# Patient Record
Sex: FEMALE | Race: OTHER | Employment: UNEMPLOYED
[De-identification: names, ages, dates, MRNs, and addresses within clinical notes are randomized per-mention and may not be internally consistent; named-entity substitution may affect disease eponyms.]

---

## 2022-11-21 ENCOUNTER — OFFICE VISIT (OUTPATIENT)
Dept: INTERNAL MEDICINE CLINIC | Facility: CLINIC | Age: 82
End: 2022-11-21
Payer: COMMERCIAL

## 2022-11-21 VITALS
BODY MASS INDEX: 24.2 KG/M2 | OXYGEN SATURATION: 98 % | DIASTOLIC BLOOD PRESSURE: 70 MMHG | HEIGHT: 61 IN | SYSTOLIC BLOOD PRESSURE: 130 MMHG | WEIGHT: 128.2 LBS | HEART RATE: 73 BPM

## 2022-11-21 DIAGNOSIS — C44.90 NON-MELANOMA SKIN CANCER: ICD-10-CM

## 2022-11-21 DIAGNOSIS — Z23 ENCOUNTER FOR IMMUNIZATION: ICD-10-CM

## 2022-11-21 DIAGNOSIS — Z87.42 HISTORY OF CERVICAL POLYPECTOMY: ICD-10-CM

## 2022-11-21 DIAGNOSIS — Z98.890 HISTORY OF CERVICAL POLYPECTOMY: ICD-10-CM

## 2022-11-21 DIAGNOSIS — Z12.11 COLON CANCER SCREENING: ICD-10-CM

## 2022-11-21 DIAGNOSIS — Z12.4 CERVICAL CANCER SCREENING: ICD-10-CM

## 2022-11-21 DIAGNOSIS — E03.9 HYPOTHYROIDISM (ACQUIRED): ICD-10-CM

## 2022-11-21 DIAGNOSIS — F41.9 ANXIETY DISORDER, UNSPECIFIED TYPE: ICD-10-CM

## 2022-11-21 DIAGNOSIS — I10 HYPERTENSION, ESSENTIAL: Primary | ICD-10-CM

## 2022-11-21 DIAGNOSIS — Z12.31 ENCOUNTER FOR SCREENING MAMMOGRAM FOR MALIGNANT NEOPLASM OF BREAST: ICD-10-CM

## 2022-11-21 PROBLEM — Z12.39 BREAST CANCER SCREENING: Status: ACTIVE | Noted: 2022-11-21

## 2022-11-21 PROCEDURE — 1123F ACP DISCUSS/DSCN MKR DOCD: CPT | Performed by: INTERNAL MEDICINE

## 2022-11-21 PROCEDURE — 3075F SYST BP GE 130 - 139MM HG: CPT | Performed by: INTERNAL MEDICINE

## 2022-11-21 PROCEDURE — 99204 OFFICE O/P NEW MOD 45 MIN: CPT | Performed by: INTERNAL MEDICINE

## 2022-11-21 PROCEDURE — 3078F DIAST BP <80 MM HG: CPT | Performed by: INTERNAL MEDICINE

## 2022-11-21 RX ORDER — IRBESARTAN 150 MG/1
150 TABLET ORAL NIGHTLY
COMMUNITY
End: 2022-11-21 | Stop reason: SDUPTHER

## 2022-11-21 RX ORDER — HYDROCHLOROTHIAZIDE 12.5 MG/1
12.5 TABLET ORAL DAILY
COMMUNITY
End: 2022-11-21 | Stop reason: SDUPTHER

## 2022-11-21 RX ORDER — LEVOTHYROXINE SODIUM 0.07 MG/1
75 TABLET ORAL DAILY
COMMUNITY
End: 2022-11-21 | Stop reason: SDUPTHER

## 2022-11-21 RX ORDER — HYDROCHLOROTHIAZIDE 12.5 MG/1
12.5 TABLET ORAL DAILY
Qty: 90 TABLET | Refills: 2 | Status: SHIPPED | OUTPATIENT
Start: 2022-11-21

## 2022-11-21 RX ORDER — ALPRAZOLAM 1 MG/1
1 TABLET ORAL AS NEEDED
COMMUNITY

## 2022-11-21 RX ORDER — LEVOTHYROXINE SODIUM 0.07 MG/1
75 TABLET ORAL DAILY
Qty: 30 TABLET | Status: CANCELLED | OUTPATIENT
Start: 2022-11-21

## 2022-11-21 RX ORDER — LEVOTHYROXINE SODIUM 0.07 MG/1
75 TABLET ORAL DAILY
Qty: 90 TABLET | Refills: 2 | Status: SHIPPED | OUTPATIENT
Start: 2022-11-21

## 2022-11-21 RX ORDER — IRBESARTAN 150 MG/1
150 TABLET ORAL NIGHTLY
Qty: 90 TABLET | Refills: 2 | Status: SHIPPED | OUTPATIENT
Start: 2022-11-21

## 2022-11-21 ASSESSMENT — PATIENT HEALTH QUESTIONNAIRE - PHQ9
SUM OF ALL RESPONSES TO PHQ QUESTIONS 1-9: 0
SUM OF ALL RESPONSES TO PHQ QUESTIONS 1-9: 0
1. LITTLE INTEREST OR PLEASURE IN DOING THINGS: 0
SUM OF ALL RESPONSES TO PHQ9 QUESTIONS 1 & 2: 0
SUM OF ALL RESPONSES TO PHQ QUESTIONS 1-9: 0
SUM OF ALL RESPONSES TO PHQ QUESTIONS 1-9: 0
2. FEELING DOWN, DEPRESSED OR HOPELESS: 0

## 2022-11-21 ASSESSMENT — ENCOUNTER SYMPTOMS
STRIDOR: 0
EYE PAIN: 0
VOICE CHANGE: 0
RECTAL PAIN: 0

## 2022-11-21 NOTE — PROGRESS NOTES
NEW PATIENT VISIT    Subjective:    Ms. Jennifer Arias is a 81 y.o., female,   Chief Complaint   Patient presents with    New Patient       HPI:    Ms. Jennifer Arias is a 81 y.o., female who presents today for a new patient appointment. Their previous primary provider was a provider in [place]. Recently moved to the [address] [place] [address],[address]. Old records were reviewed. The patient has hypertension. The patient has been on an attempted low sodium diet and has been trying to exercise and maintain a healthy weight. The patient reports good compliance with the blood pressure medications. The patient has hypothyroidism. The patient denies any symptoms of hypo- or hyperthyroidism on the current dose of levothyroxine. She has anxiety attacks. Aborts with alprazolam PRN. Uses 1-2 x per week. Does not drive, use alcohol, or other sedatives. Finally has a history of basal cell skin cancer. Has lesion on central chest.  Requests referral to dermatology. The following portions of the patient's history were reviewed and updated as appropriate:      Past Medical History:   Diagnosis Date    Anxiety disorder     Hypertension, essential     Hypothyroidism (acquired)     Non-melanoma skin cancer        Past Surgical History:   Procedure Laterality Date    BREAST REDUCTION SURGERY      CATARACT EXTRACTION      CHOLECYSTECTOMY, LAPAROSCOPIC      DENTAL SURGERY      multiple extraction / dentures       Family History   Problem Relation Age of Onset    No Known Problems Mother     No Known Problems Father        Social History     Socioeconomic History    Marital status:       Spouse name: Not on file    Number of children: Not on file    Years of education: Not on file    Highest education level: Not on file   Occupational History    Not on file   Tobacco Use    Smoking status: Never    Smokeless tobacco: Never   Substance and Sexual Activity    Alcohol use: Never    Drug use: Never    Sexual activity: Not on file   Other Topics Concern    Not on file   Social History Narrative    Not on file     Social Determinants of Health     Financial Resource Strain: Not on file   Food Insecurity: Not on file   Transportation Needs: Not on file   Physical Activity: Not on file   Stress: Not on file   Social Connections: Not on file   Intimate Partner Violence: Not on file   Housing Stability: Not on file       Current Outpatient Medications   Medication Sig Dispense Refill    ALPRAZolam (XANAX) 1 MG tablet Take 1 mg by mouth as needed for Sleep.      hydroCHLOROthiazide (HYDRODIURIL) 12.5 MG tablet Take 1 tablet by mouth daily 90 tablet 2    irbesartan (AVAPRO) 150 MG tablet Take 1 tablet by mouth nightly 90 tablet 2    levothyroxine (SYNTHROID) 75 MCG tablet Take 1 tablet by mouth Daily 90 tablet 2     No current facility-administered medications for this visit. Allergies as of 11/21/2022    (Not on File)       Review of Systems   Constitutional:  Negative for activity change and appetite change. HENT:  Negative for drooling and voice change. Eyes:  Negative for pain. Respiratory:  Negative for stridor. Gastrointestinal:  Negative for rectal pain. Endocrine: Negative for polydipsia and polyphagia. Genitourinary:  Negative for dyspareunia and enuresis. Musculoskeletal:  Negative for gait problem and neck stiffness. Skin:  Negative for pallor. Neurological:  Negative for facial asymmetry and speech difficulty. Hematological:  Does not bruise/bleed easily. Psychiatric/Behavioral:  Negative for self-injury. The patient is not hyperactive. Patient Care Team:  Nette Fountain MD as PCP - General (Internal Medicine)    Objective:    /70   Pulse 73   Ht 5' 1\" (1.549 m)   Wt 128 lb 3.2 oz (58.2 kg)   SpO2 98%   BMI 24.22 kg/m²     Physical Exam  Vitals reviewed. Constitutional:       General: She is not in acute distress. Appearance: She is not toxic-appearing.    HENT:      Head: Normocephalic and atraumatic. Right Ear: Tympanic membrane, ear canal and external ear normal.      Left Ear: Tympanic membrane, ear canal and external ear normal.      Nose: Nose normal.      Mouth/Throat:      Mouth: Mucous membranes are moist.      Pharynx: Oropharynx is clear. Eyes:      General: No scleral icterus. Extraocular Movements: Extraocular movements intact. Pupils: Pupils are equal, round, and reactive to light. Cardiovascular:      Rate and Rhythm: Normal rate and regular rhythm. Pulses: Normal pulses. Heart sounds: Normal heart sounds. Pulmonary:      Effort: Pulmonary effort is normal. No respiratory distress. Breath sounds: Normal breath sounds. No stridor. Abdominal:      General: Abdomen is flat. Bowel sounds are normal.      Palpations: Abdomen is soft. There is no mass. Tenderness: There is no guarding or rebound. Musculoskeletal:         General: Normal range of motion. Cervical back: Normal range of motion and neck supple. Skin:     General: Skin is warm and dry. Coloration: Skin is not jaundiced. Comments: Patient has ~ 2 cm lesion on central chest.  Present for one year. Possible BCC. Neurological:      Mental Status: She is alert and oriented to person, place, and time. Mental status is at baseline. Psychiatric:         Behavior: Behavior normal.         Thought Content: Thought content normal.            No results found for any previous visit. Assessent & Plan:        1. Hypertension, essential  Overview:  Well controlled on current Avapro and HCTZ. The patient will continue the current treatment. Orders:  -     Comprehensive Metabolic Panel; Future  -     Lipid Panel; Future  -     hydroCHLOROthiazide (HYDRODIURIL) 12.5 MG tablet; Take 1 tablet by mouth daily, Disp-90 tablet, R-2Normal  -     irbesartan (AVAPRO) 150 MG tablet; Take 1 tablet by mouth nightly, Disp-90 tablet, R-2Normal  2.  Anxiety disorder, unspecified type  Overview:  Uses alprazolam daily PRN for panic attacks. 3. Non-melanoma skin cancer  Overview:  Refer to dermatology for surveillance. History of multiple basal cell excisions. Has possible SCC vs BCC central chest.    Orders:  -     [facility]  4. Hypothyroidism (acquired)  Overview:  Update labs on levothyroxine 75 mcg daily. Orders:  -     TSH; Future  -     levothyroxine (SYNTHROID) 75 MCG tablet; Take 1 tablet by mouth Daily, Disp-90 tablet, R-2Normal  5. Encounter for screening mammogram for malignant neoplasm of breast  Overview:  Discussed breast cancer screening age > [de-identified]. She requests mammogram.  Ordered. Orders:  -     [facility] DIGITAL SCREEN W OR WO CAD BILATERAL; Future  6. Cervical cancer screening  Overview:  Lifelong normal Pap / low risk / age > [de-identified]. Nevertheless patient requests referral to gyn for Pap smear. She is worried about having had a cervical polyp removed in the past and wants to make sure there has been no recurrence. Prefers female examiner. Orders:  -     Angel[facility] Salgado   7. History of cervical polypectomy  -     Angel[facility] Salgado   8. Colon cancer screening  Overview:  Defer age > [de-identified].    5. Encounter for immunization  Overview:  Vaccinations were reviewed and discussed. Recommended Covid booster, Shingrix, flu shot. The patient and/or patient representative voiced understanding and agreement with the current diagnoses, recommendations, and possible side effects. Return in about 2 months (around 1/21/2023) for follow up of chronic medical problems, review labs.